# Patient Record
Sex: FEMALE | Race: BLACK OR AFRICAN AMERICAN | NOT HISPANIC OR LATINO | Employment: UNEMPLOYED | URBAN - METROPOLITAN AREA
[De-identification: names, ages, dates, MRNs, and addresses within clinical notes are randomized per-mention and may not be internally consistent; named-entity substitution may affect disease eponyms.]

---

## 2023-05-07 ENCOUNTER — HOSPITAL ENCOUNTER (EMERGENCY)
Facility: HOSPITAL | Age: 48
Discharge: HOME | End: 2023-05-07
Attending: INTERNAL MEDICINE

## 2023-05-07 VITALS
OXYGEN SATURATION: 100 % | BODY MASS INDEX: 20.09 KG/M2 | TEMPERATURE: 98.4 F | RESPIRATION RATE: 16 BRPM | HEART RATE: 70 BPM | HEIGHT: 66 IN | DIASTOLIC BLOOD PRESSURE: 60 MMHG | SYSTOLIC BLOOD PRESSURE: 125 MMHG | WEIGHT: 125 LBS

## 2023-05-07 DIAGNOSIS — N39.0 UTI (URINARY TRACT INFECTION): ICD-10-CM

## 2023-05-07 DIAGNOSIS — D50.9 IRON DEFICIENCY ANEMIA, UNSPECIFIED IRON DEFICIENCY ANEMIA TYPE: Primary | ICD-10-CM

## 2023-05-07 LAB
ALBUMIN SERPL BCP-MCNC: 3.7 G/DL (ref 3.5–5)
ALP SERPL-CCNC: 38 U/L (ref 34–104)
ALT SERPL W P-5'-P-CCNC: 7 U/L (ref 7–52)
ANION GAP SERPL CALCULATED.3IONS-SCNC: 7 MMOL/L (ref 4–13)
AST SERPL W P-5'-P-CCNC: 19 U/L (ref 13–39)
BACTERIA UR QL AUTO: ABNORMAL /HPF
BASOPHILS # BLD AUTO: 0.02 THOUSANDS/ÂΜL (ref 0–0.1)
BASOPHILS NFR BLD AUTO: 0 % (ref 0–1)
BILIRUB SERPL-MCNC: 0.2 MG/DL (ref 0.2–1)
BILIRUB UR QL STRIP: NEGATIVE
BUN SERPL-MCNC: 12 MG/DL (ref 5–25)
CALCIUM SERPL-MCNC: 8.7 MG/DL (ref 8.4–10.2)
CHLORIDE SERPL-SCNC: 106 MMOL/L (ref 96–108)
CLARITY UR: ABNORMAL
CO2 SERPL-SCNC: 23 MMOL/L (ref 21–32)
COLOR UR: YELLOW
CREAT SERPL-MCNC: 0.74 MG/DL (ref 0.6–1.3)
EOSINOPHIL # BLD AUTO: 0.06 THOUSAND/ÂΜL (ref 0–0.61)
EOSINOPHIL NFR BLD AUTO: 1 % (ref 0–6)
ERYTHROCYTE [DISTWIDTH] IN BLOOD BY AUTOMATED COUNT: 19.3 % (ref 11.6–15.1)
GFR SERPL CREATININE-BSD FRML MDRD: 97 ML/MIN/1.73SQ M
GLUCOSE SERPL-MCNC: 96 MG/DL (ref 65–140)
GLUCOSE UR STRIP-MCNC: NEGATIVE MG/DL
HCT VFR BLD AUTO: 21.2 % (ref 34.8–46.1)
HGB BLD-MCNC: 6.1 G/DL (ref 11.5–15.4)
HGB UR QL STRIP.AUTO: ABNORMAL
IMM GRANULOCYTES # BLD AUTO: 0.02 THOUSAND/UL (ref 0–0.2)
IMM GRANULOCYTES NFR BLD AUTO: 0 % (ref 0–2)
KETONES UR STRIP-MCNC: NEGATIVE MG/DL
LEUKOCYTE ESTERASE UR QL STRIP: ABNORMAL
LYMPHOCYTES # BLD AUTO: 2.01 THOUSANDS/ÂΜL (ref 0.6–4.47)
LYMPHOCYTES NFR BLD AUTO: 35 % (ref 14–44)
MCH RBC QN AUTO: 19.6 PG (ref 26.8–34.3)
MCHC RBC AUTO-ENTMCNC: 28.8 G/DL (ref 31.4–37.4)
MCV RBC AUTO: 68 FL (ref 82–98)
MONOCYTES # BLD AUTO: 0.47 THOUSAND/ÂΜL (ref 0.17–1.22)
MONOCYTES NFR BLD AUTO: 8 % (ref 4–12)
NEUTROPHILS # BLD AUTO: 3.16 THOUSANDS/ÂΜL (ref 1.85–7.62)
NEUTS SEG NFR BLD AUTO: 56 % (ref 43–75)
NITRITE UR QL STRIP: NEGATIVE
NON-SQ EPI CELLS URNS QL MICRO: ABNORMAL /HPF
NRBC BLD AUTO-RTO: 0 /100 WBCS
PH UR STRIP.AUTO: 6 [PH]
PLATELET # BLD AUTO: 207 THOUSANDS/UL (ref 149–390)
PMV BLD AUTO: 10.1 FL (ref 8.9–12.7)
POTASSIUM SERPL-SCNC: 3.8 MMOL/L (ref 3.5–5.3)
PROT SERPL-MCNC: 7.1 G/DL (ref 6.4–8.4)
PROT UR STRIP-MCNC: ABNORMAL MG/DL
RBC # BLD AUTO: 3.12 MILLION/UL (ref 3.81–5.12)
RBC #/AREA URNS AUTO: ABNORMAL /HPF
SODIUM SERPL-SCNC: 136 MMOL/L (ref 135–147)
SP GR UR STRIP.AUTO: 1.02 (ref 1–1.03)
UROBILINOGEN UR QL STRIP.AUTO: 0.2 E.U./DL
WBC # BLD AUTO: 5.74 THOUSAND/UL (ref 4.31–10.16)
WBC #/AREA URNS AUTO: ABNORMAL /HPF

## 2023-05-07 RX ORDER — AMOXICILLIN AND CLAVULANATE POTASSIUM 875; 125 MG/1; MG/1
1 TABLET, FILM COATED ORAL EVERY 12 HOURS
Qty: 14 TABLET | Refills: 0 | Status: SHIPPED | OUTPATIENT
Start: 2023-05-07 | End: 2023-05-14

## 2023-05-07 RX ORDER — CEFTRIAXONE 1 G/50ML
1000 INJECTION, SOLUTION INTRAVENOUS ONCE
Status: COMPLETED | OUTPATIENT
Start: 2023-05-07 | End: 2023-05-07

## 2023-05-07 RX ADMIN — CEFTRIAXONE 1000 MG: 1 INJECTION, SOLUTION INTRAVENOUS at 08:15

## 2023-05-07 RX ADMIN — IRON SUCROSE 300 MG: 20 INJECTION, SOLUTION INTRAVENOUS at 09:33

## 2023-05-07 NOTE — ED PROVIDER NOTES
History  Chief Complaint   Patient presents with   • Abdominal Pain     Pt presents to the ED with c/o lower quadrant abdominal and suprapubic pain  Pt reports urinary frequency and discomfort with urination     This is a 56years old came for having polyuria discomfort with urination and suprapubic pressure  Patient stated that she passed small amount of urine with his discomfort  Patient denies any history of UTI  Patient denies abdominal pain flank pain  Patient denies history of nephrolithiasis  Patient stated that many years ago her doctor told her that she has anemia and they give her iron tablets but currently she does not take any medications  Patient denies a smoking or alcoholism or ta/sara drugs  Patient denies being diabetic  Patient has no CP, SOB, nausea vomiting diarrhea  Patient looks pale but she denies any dizziness  LMP 4/28/23  Patient is sexually active and she stated that her boyfriend used a front kind of condoms and she asked whether this is causing her urinary problems  Patient denies any vaginal discharge or bleeding  None       History reviewed  No pertinent past medical history  History reviewed  No pertinent surgical history  History reviewed  No pertinent family history  I have reviewed and agree with the history as documented  E-Cigarette/Vaping     E-Cigarette/Vaping Substances     Social History     Tobacco Use   • Smoking status: Never   • Smokeless tobacco: Never   Substance Use Topics   • Alcohol use: Yes     Comment: occasional   • Drug use: Never       Review of Systems   Constitutional: Negative for fatigue and fever  Respiratory: Negative for cough and shortness of breath  Cardiovascular: Negative for chest pain and palpitations  Gastrointestinal: Negative for abdominal pain, diarrhea, nausea and vomiting  Endocrine: Positive for polyuria  Genitourinary: Positive for dysuria   Negative for decreased urine volume, difficulty urinating, flank pain, frequency, menstrual problem, pelvic pain, urgency, vaginal bleeding, vaginal discharge and vaginal pain  Musculoskeletal: Negative for back pain, myalgias, neck pain and neck stiffness  Skin: Negative for color change, pallor and rash  Neurological: Negative for dizziness, light-headedness and headaches  Psychiatric/Behavioral: Negative for agitation and behavioral problems  Physical Exam  Physical Exam  Vitals and nursing note reviewed  Constitutional:       General: She is not in acute distress  Appearance: She is well-developed  She is not ill-appearing, toxic-appearing or diaphoretic  HENT:      Head: Normocephalic and atraumatic  Mouth/Throat:      Mouth: Mucous membranes are moist       Pharynx: No pharyngeal swelling or oropharyngeal exudate  Eyes:      General: No scleral icterus  Extraocular Movements: Extraocular movements intact  Cardiovascular:      Rate and Rhythm: Normal rate and regular rhythm  Heart sounds: Normal heart sounds  No murmur heard  No friction rub  No gallop  Pulmonary:      Effort: Pulmonary effort is normal  No respiratory distress  Breath sounds: Normal breath sounds  No stridor  No wheezing, rhonchi or rales  Chest:      Chest wall: No tenderness  Abdominal:      General: Abdomen is flat  Bowel sounds are normal  There is no distension or abdominal bruit  There are no signs of injury  Palpations: Abdomen is soft  There is no shifting dullness, fluid wave, hepatomegaly, splenomegaly, mass or pulsatile mass  Tenderness: There is no abdominal tenderness  There is no right CVA tenderness, left CVA tenderness, guarding or rebound  Negative signs include Ahmadi's sign, Rovsing's sign, McBurney's sign, psoas sign and obturator sign  Musculoskeletal:         General: No tenderness or deformity  Normal range of motion  Cervical back: Normal range of motion and neck supple     Skin:     General: Skin is warm and dry       Capillary Refill: Capillary refill takes less than 2 seconds  Coloration: Skin is pale  Skin is not cyanotic, jaundiced or mottled  Findings: No erythema or rash  Neurological:      Mental Status: She is alert and oriented to person, place, and time  Psychiatric:         Behavior: Behavior normal          Vital Signs  ED Triage Vitals [05/07/23 0702]   Temperature Pulse Respirations Blood Pressure SpO2   98 4 °F (36 9 °C) 73 16 147/73 100 %      Temp Source Heart Rate Source Patient Position - Orthostatic VS BP Location FiO2 (%)   Oral Monitor Sitting Right arm --      Pain Score       8           Vitals:    05/07/23 0702 05/07/23 0934   BP: 147/73 125/60   Pulse: 73 70   Patient Position - Orthostatic VS: Sitting Lying         Visual Acuity      ED Medications  Medications   iron sucrose (VENOFER) 300 mg in sodium chloride 0 9 % 250 mL IVPB (0 mg Intravenous Stopped 5/7/23 1147)   cefTRIAXone (ROCEPHIN) IVPB (premix in dextrose) 1,000 mg 50 mL (0 mg Intravenous Stopped 5/7/23 0934)       Diagnostic Studies  Results Reviewed     Procedure Component Value Units Date/Time    Urine Microscopic [[id number]]  (Abnormal) Collected: 05/07/23 0717    Lab Status: Final result Specimen: Urine, Clean Catch Updated: 05/07/23 0751     RBC, UA 20-30 /hpf      WBC, UA Innumerable /hpf      Epithelial Cells Moderate /hpf      Bacteria, UA Occasional /hpf     Urine culture [[id number]] Collected: 05/07/23 0717    Lab Status:  In process Specimen: Urine, Clean Catch Updated: 05/07/23 0751    Comprehensive metabolic panel [[id number]] Collected: 05/07/23 0720    Lab Status: Final result Specimen: Blood from Arm, Right Updated: 05/07/23 0743     Sodium 136 mmol/L      Potassium 3 8 mmol/L      Chloride 106 mmol/L      CO2 23 mmol/L      ANION GAP 7 mmol/L      BUN 12 mg/dL      Creatinine 0 74 mg/dL      Glucose 96 mg/dL      Calcium 8 7 mg/dL      AST 19 U/L      ALT 7 U/L      Alkaline Phosphatase 38 U/L Total Protein 7 1 g/dL      Albumin 3 7 g/dL      Total Bilirubin 0 20 mg/dL      eGFR 97 ml/min/1 73sq m     Narrative:      [employer] guidelines for Chronic Kidney Disease (CKD):   •  Stage 1 with normal or high GFR (GFR > 90 mL/min/1 73 square meters)  •  Stage 2 Mild CKD (GFR = 60-89 mL/min/1 73 square meters)  •  Stage 3A Moderate CKD (GFR = 45-59 mL/min/1 73 square meters)  •  Stage 3B Moderate CKD (GFR = 30-44 mL/min/1 73 square meters)  •  Stage 4 Severe CKD (GFR = 15-29 mL/min/1 73 square meters)  •  Stage 5 End Stage CKD (GFR <15 mL/min/1 73 square meters)  Note: GFR calculation is accurate only with a steady state creatinine    CBC and differential [[id number]]  (Abnormal) Collected: 05/07/23 0720    Lab Status: Final result Specimen: Blood from Arm, Right Updated: 05/07/23 0740     WBC 5 74 Thousand/uL      RBC 3 12 Million/uL      Hemoglobin 6 1 g/dL      Hematocrit 21 2 %      MCV 68 fL      MCH 19 6 pg      MCHC 28 8 g/dL      RDW 19 3 %      MPV 10 1 fL      Platelets 870 Thousands/uL      nRBC 0 /100 WBCs      Neutrophils Relative 56 %      Immat GRANS % 0 %      Lymphocytes Relative 35 %      Monocytes Relative 8 %      Eosinophils Relative 1 %      Basophils Relative 0 %      Neutrophils Absolute 3 16 Thousands/µL      Immature Grans Absolute 0 02 Thousand/uL      Lymphocytes Absolute 2 01 Thousands/µL      Monocytes Absolute 0 47 Thousand/µL      Eosinophils Absolute 0 06 Thousand/µL      Basophils Absolute 0 02 Thousands/µL     Narrative: This is an appended report  These results have been appended to a previously verified report      UA w Reflex to Microscopic w Reflex to Culture [[id number]]  (Abnormal) Collected: 05/07/23 0717    Lab Status: Final result Specimen: Urine, Clean Catch Updated: 05/07/23 0725     Color, UA Yellow     Clarity, UA Cloudy     Specific Gravity, UA 1 025     pH, UA 6 0     Leukocytes, UA 3+     Nitrite, UA Negative     Protein, UA 2+ mg/dl Glucose, UA Negative mg/dl      Ketones, UA Negative mg/dl      Urobilinogen, UA 0 2 E U /dl      Bilirubin, UA Negative     Occult Blood, UA 3+    Chlamydia/GC amplified DNA by PCR [[id number]] Collected: 05/07/23 0717    Lab Status: In process Specimen: Urine, Other Updated: 05/07/23 0722                 No orders to display              Procedures  Procedures         ED Course                               SBIRT 20yo+    Flowsheet Row Most Recent Value   Initial Alcohol Screen: US AUDIT-C     1  How often do you have a drink containing alcohol? 0 Filed at: 05/07/2023 0712   2  How many drinks containing alcohol do you have on a typical day you are drinking? 0 Filed at: 05/07/2023 0712   3a  Male UNDER 65: How often do you have five or more drinks on one occasion? 0 Filed at: 05/07/2023 0712   3b  FEMALE Any Age, or MALE 65+: How often do you have 4 or more drinks on one occassion? 0 Filed at: 05/07/2023 4729   Audit-C Score 0 Filed at: 05/07/2023 8404   ALETHEA: How many times in the past year have you    Used an illegal drug or used a prescription medication for non-medical reasons? Never Filed at: 05/07/2023 2261                    Medical Decision Making  This is a 56years old came for having polyuria and urinary discomfort  Patient has suprapubic pressure  Physical exam shows a pale woman and no abdominal tenderness  Labs came back shows hemoglobin 6 1  The labs consistent with iron deficiency anemia  Patient asked if she has any heavy  Or bleeding from the rectum she denies  Patient stated that she has history of anemia long time ago and she was on iron tablets but she did not continue on it  When I discussed with the patient she refused blood transfusion so we will give iron infusion at the ER  Patient instructed to follow-up with hematologist   The urine came back shows infection  Patient received 1 dose of Rocephin  Can discharge home on Augmentin    Patient urine for chlamydia/GC as patient is sexually active  Patient advised to drink a lot of water  All question concerns of been addressed  Patient tolerated the iron infusion well  Amount and/or Complexity of Data Reviewed  Labs: ordered  Details: Hb 6 1 , UA ; inummerable for WBC  Risk  Prescription drug management  Disposition  Final diagnoses:   Iron deficiency anemia, unspecified iron deficiency anemia type   UTI (urinary tract infection)     Time reflects when diagnosis was documented in both MDM as applicable and the Disposition within this note     Time User Action Codes Description Comment    5/7/2023 11:00 AM Addie Harper Add [D50 9] Iron deficiency anemia, unspecified iron deficiency anemia type     5/7/2023 11:00 AM Addie Harper Add [N39 0] UTI (urinary tract infection)       ED Disposition     ED Disposition   Discharge    Condition   Stable    Date/Time   Sun May 7, 2023 10:59 AM    Comment   [facility] discharge to home/self care  Follow-up Information     Follow up With Specialties Details Why Leslie 57, DO Hematology and Oncology, Hematology, Oncology In 1 week  [address] [facility] [address] [phone]            Discharge Medication List as of 5/7/2023 11:03 AM      START taking these medications    Details   amoxicillin-clavulanate (AUGMENTIN) 875-125 mg per tablet Take 1 tablet by mouth every 12 (twelve) hours for 7 days, Starting Sun 5/7/2023, Until Sun 5/14/2023, Normal             No discharge procedures on file      PDMP Review     None          ED Provider  Electronically Signed by           Zoya Neumann MD  05/08/23 5871

## 2023-05-07 NOTE — ED NOTES
Pt awaiting for the iron infusion IV to complete prior to d/c     Evelia Marquez, MATEO  05/07/23 1128

## 2023-05-07 NOTE — DISCHARGE INSTRUCTIONS
Follow up with neurologist dr Dinorah espinoza of water  Take medications as prescribed  Labs Reviewed   CBC AND DIFFERENTIAL - Abnormal       Result Value Ref Range Status    WBC 5 74  4 31 - 10 16 Thousand/uL Final    RBC 3 12 (*) 3 81 - 5 12 Million/uL Final    Hemoglobin 6 1 (*) 11 5 - 15 4 g/dL Final    Hematocrit 21 2 (*) 34 8 - 46 1 % Final    MCV 68 (*) 82 - 98 fL Final    MCH 19 6 (*) 26 8 - 34 3 pg Final    MCHC 28 8 (*) 31 4 - 37 4 g/dL Final    RDW 19 3 (*) 11 6 - 15 1 % Final    MPV 10 1  8 9 - 12 7 fL Final    Platelets 735  058 - 390 Thousands/uL Final    nRBC 0  /100 WBCs Final    Neutrophils Relative 56  43 - 75 % Final    Immat GRANS % 0  0 - 2 % Final    Lymphocytes Relative 35  14 - 44 % Final    Monocytes Relative 8  4 - 12 % Final    Eosinophils Relative 1  0 - 6 % Final    Basophils Relative 0  0 - 1 % Final    Neutrophils Absolute 3 16  1 85 - 7 62 Thousands/µL Final    Immature Grans Absolute 0 02  0 00 - 0 20 Thousand/uL Final    Lymphocytes Absolute 2 01  0 60 - 4 47 Thousands/µL Final    Monocytes Absolute 0 47  0 17 - 1 22 Thousand/µL Final    Eosinophils Absolute 0 06  0 00 - 0 61 Thousand/µL Final    Basophils Absolute 0 02  0 00 - 0 10 Thousands/µL Final    Narrative: This is an appended report  These results have been appended to a previously verified report     UA W REFLEX TO MICROSCOPIC WITH REFLEX TO CULTURE - Abnormal    Color, UA Yellow  Yellow Final    Clarity, UA Cloudy (*) Clear Final    Specific Gravity, UA 1 025  1 001 - 1 030 Final    pH, UA 6 0  5 0, 5 5, 6 0, 6 5, 7 0, 7 5, 8 0 Final    Leukocytes, UA 3+ (*) Negative Final    Nitrite, UA Negative  Negative Final    Protein, UA 2+ (*) Negative, Interference- unable to analyze mg/dl Final    Glucose, UA Negative  Negative mg/dl Final    Ketones, UA Negative  Negative mg/dl Final    Urobilinogen, UA 0 2  0 2, 1 0 E U /dl E U /dl Final    Bilirubin, UA Negative  Negative Final    Occult Blood, UA 3+ (*) Negative Final   URINE MICROSCOPIC - Abnormal    RBC, UA 20-30 (*) None Seen, 0-1, 1-2, 2-4, 0-5 /hpf Final    WBC, UA Innumerable (*) None Seen, 0-1, 1-2, 0-5, 2-4 /hpf Final    Epithelial Cells Moderate (*) None Seen, Occasional /hpf Final    Bacteria, UA Occasional  None Seen, Occasional /hpf Final   CHLAMYDIA /GC AMPLIFIED DNA   URINE CULTURE   COMPREHENSIVE METABOLIC PANEL    Sodium 840  135 - 147 mmol/L Final    Potassium 3 8  3 5 - 5 3 mmol/L Final    Chloride 106  96 - 108 mmol/L Final    CO2 23  21 - 32 mmol/L Final    ANION GAP 7  4 - 13 mmol/L Final    BUN 12  5 - 25 mg/dL Final    Creatinine 0 74  0 60 - 1 30 mg/dL Final    Comment: Standardized to IDMS reference method    Glucose 96  65 - 140 mg/dL Final    Comment: If the patient is fasting, the ADA then defines impaired fasting glucose as > 100 mg/dL and diabetes as > or equal to 123 mg/dL  Calcium 8 7  8 4 - 10 2 mg/dL Final    AST 19  13 - 39 U/L Final    ALT 7  7 - 52 U/L Final    Comment: Specimen collection should occur prior to Sulfasalazine administration due to the potential for falsely depressed results  Alkaline Phosphatase 38  34 - 104 U/L Final    Total Protein 7 1  6 4 - 8 4 g/dL Final    Albumin 3 7  3 5 - 5 0 g/dL Final    Total Bilirubin 0 20  0 20 - 1 00 mg/dL Final    Comment: Use of this assay is not recommended for patients undergoing treatment with eltrombopag due to the potential for falsely elevated results  N-acetyl-p-benzoquinone imine (metabolite of Acetaminophen) will generate erroneously low results in samples for patients that have taken an overdose of Acetaminophen      eGFR 97  ml/min/1 73sq m Final    Narrative:     [place] guidelines for Chronic Kidney Disease (CKD):     Stage 1 with normal or high GFR (GFR > 90 mL/min/1 73 square meters)    Stage 2 Mild CKD (GFR = 60-89 mL/min/1 73 square meters)    Stage 3A Moderate CKD (GFR = 45-59 mL/min/1 73 square meters)    Stage 3B Moderate CKD (GFR = 30-44 mL/min/1 73 square meters)    Stage 4 Severe CKD (GFR = 15-29 mL/min/1 73 square meters)    Stage 5 End Stage CKD (GFR <15 mL/min/1 73 square meters)  Note: GFR calculation is accurate only with a steady state creatinine

## 2023-05-08 LAB — BACTERIA UR CULT: NORMAL

## 2023-05-09 LAB
C TRACH DNA SPEC QL NAA+PROBE: NEGATIVE
N GONORRHOEA DNA SPEC QL NAA+PROBE: NEGATIVE

## 2023-05-16 ENCOUNTER — TELEPHONE (OUTPATIENT)
Dept: HEMATOLOGY ONCOLOGY | Facility: CLINIC | Age: 48
End: 2023-05-16

## 2023-05-16 NOTE — TELEPHONE ENCOUNTER
Patient is calling in to schedule new patient appointment, she is requesting an appointment in a weeks time but I explained to patient that we currently don't have any appointments within the time frame she is requesting   Patient has decided not to schedule appointment and referral has been closed